# Patient Record
(demographics unavailable — no encounter records)

---

## 2025-04-11 NOTE — REASON FOR VISIT
[Follow-up - Scheduled] : a follow-up, scheduled visit for [Father] : father [FreeTextEntry3] : s/p right inguinal hernia repair 3/25/25., right inguinal hernia

## 2025-04-11 NOTE — HISTORY OF PRESENT ILLNESS
[FreeTextEntry1] : Fawad Grimaldo is a 1 y/o male s/p a right inguinal hernia repair on 3/25/2025 in [facility].  Pt did well postop and was running around soon after getting home.  Pt is eating and stooling normally with no pain, swelling, nor infection.  He is here for a postoperative visit.

## 2025-04-11 NOTE — CONSULT LETTER
[Dear  ___] : Dear  [unfilled], [Please see my note below.] : Please see my note below. [FreeTextEntry1] : I had the pleasure of seeing YAA VANEGAS in my office on Apr 11, 2025 Thank you very much for letting me participate in YAA VNAEGAS 's care and I will keep you informed of his progress. Sincerely, Rajat Mendenhall M.D.

## 2025-04-11 NOTE — PHYSICAL EXAM
[NL] : no acute distress, alert [Acute Distress] : no acute distress [Alert] : alert [Toxic appearing] : well appearing [TextBox_37] : Soft, non-tender, non-distended, with positive bowel sounds.  There are no masses and no organomegaly.  Right groin- wound is clean, dry, and intact. Testicle down and no signs of infection nor recurrence. Area with minimal swelling. No left sided defect.

## 2025-04-11 NOTE — ASSESSMENT
[FreeTextEntry1] : Overall, Fawad is a 1 y/o male s/p a right inguinal hernia repair with no issues nor complications. He is back to his usual baseline self.  Instructions were given in regard to wound care and exercise management.  He may to return to the office as needed.